# Patient Record
Sex: MALE | Race: BLACK OR AFRICAN AMERICAN | NOT HISPANIC OR LATINO | URBAN - METROPOLITAN AREA
[De-identification: names, ages, dates, MRNs, and addresses within clinical notes are randomized per-mention and may not be internally consistent; named-entity substitution may affect disease eponyms.]

---

## 2018-05-09 ENCOUNTER — EMERGENCY (EMERGENCY)
Facility: HOSPITAL | Age: 28
LOS: 1 days | Discharge: HOME | End: 2018-05-09
Attending: EMERGENCY MEDICINE | Admitting: EMERGENCY MEDICINE
Payer: COMMERCIAL

## 2018-05-09 VITALS
SYSTOLIC BLOOD PRESSURE: 129 MMHG | HEART RATE: 85 BPM | RESPIRATION RATE: 16 BRPM | TEMPERATURE: 98 F | DIASTOLIC BLOOD PRESSURE: 87 MMHG | WEIGHT: 160.06 LBS | OXYGEN SATURATION: 99 %

## 2018-05-09 VITALS — HEIGHT: 69 IN | WEIGHT: 205.03 LBS

## 2018-05-09 DIAGNOSIS — Z88.8 ALLERGY STATUS TO OTHER DRUGS, MEDICAMENTS AND BIOLOGICAL SUBSTANCES: ICD-10-CM

## 2018-05-09 DIAGNOSIS — Z91.018 ALLERGY TO OTHER FOODS: ICD-10-CM

## 2018-05-09 DIAGNOSIS — G40.909 EPILEPSY, UNSPECIFIED, NOT INTRACTABLE, WITHOUT STATUS EPILEPTICUS: ICD-10-CM

## 2018-05-09 DIAGNOSIS — R56.9 UNSPECIFIED CONVULSIONS: ICD-10-CM

## 2018-05-09 PROCEDURE — 99283 EMERGENCY DEPT VISIT LOW MDM: CPT

## 2018-05-09 PROCEDURE — 99284 EMERGENCY DEPT VISIT MOD MDM: CPT

## 2018-05-09 NOTE — ED PROVIDER NOTE - HISTORY ATTESTATION, MLM
"Chief Complaint   Patient presents with     URI       Initial Pulse 92  Temp 98.4  F (36.9  C) (Temporal)  Resp 22  Wt 20 lb 9.6 oz (9.344 kg) Estimated body mass index is 14.73 kg/(m^2) as calculated from the following:    Height as of 12/20/17: 2' 6\" (0.762 m).    Weight as of 12/20/17: 18 lb 13.6 oz (8.55 kg).  Medication Reconciliation: complete   Suzanne Tobias CMA      "
I have reviewed and confirmed nurses' notes...

## 2018-05-09 NOTE — ED ADULT NURSE NOTE - OBJECTIVE STATEMENT
Pt BIBA after notation of 'AURA' that he usually has before his seizures. 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 Currently tx with DEPAKOTE, CLONAZEPAM. Pt stated that upon EMS arrival, he felt improved. Per EMS no seizure activity observed. Pt arrived to ED alert, orientated, without incontinence.

## 2018-05-09 NOTE — ED PROVIDER NOTE - MEDICAL DECISION MAKING DETAILS
counseled on follow up with psychiatrist and neurology -> no seizure reported + compliant with med s+ fully stable in ED

## 2018-05-09 NOTE — ED PROVIDER NOTE - OBJECTIVE STATEMENT
reports feeling of Aura as if might have a seizure today  + compliant with bipolar and  seizure depakote meds. Denies overt seizure. no HA

## 2018-05-09 NOTE — ED PROVIDER NOTE - ATTENDING CONTRIBUTION TO CARE
28 yom states he feels the aura of having a seizure, but denies any sz activities or reported sz activities.  no pain, no HA, no paresthesia.     agree w/ PA, avss, nad, nonfocal neuro exam, supportive care, reassurance

## 2018-05-09 NOTE — ED ADULT TRIAGE NOTE - CHIEF COMPLAINT QUOTE
I feel like I will have a seizure (no seizure activity) ; compliant of Depakote last dose 1500mg this morning
